# Patient Record
Sex: FEMALE | Race: BLACK OR AFRICAN AMERICAN | Employment: STUDENT | URBAN - METROPOLITAN AREA
[De-identification: names, ages, dates, MRNs, and addresses within clinical notes are randomized per-mention and may not be internally consistent; named-entity substitution may affect disease eponyms.]

---
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
## 2025-02-03 ENCOUNTER — OFFICE VISIT (OUTPATIENT)
Dept: PRIMARY CARE CLINIC | Facility: CLINIC | Age: 20
End: 2025-02-03

## 2025-02-03 VITALS
HEART RATE: 80 BPM | BODY MASS INDEX: 39.82 KG/M2 | TEMPERATURE: 97.9 F | WEIGHT: 239 LBS | DIASTOLIC BLOOD PRESSURE: 80 MMHG | OXYGEN SATURATION: 98 % | SYSTOLIC BLOOD PRESSURE: 118 MMHG | HEIGHT: 65 IN

## 2025-02-03 DIAGNOSIS — Z11.1 SCREENING EXAMINATION FOR PULMONARY TUBERCULOSIS: ICD-10-CM

## 2025-02-03 DIAGNOSIS — Z00.00 ENCOUNTER FOR WELL ADULT EXAM WITHOUT ABNORMAL FINDINGS: Primary | ICD-10-CM

## 2025-02-03 DIAGNOSIS — Z02.89 ENCOUNTER FOR COMPLETION OF FORM WITH PATIENT: ICD-10-CM

## 2025-02-03 SDOH — ECONOMIC STABILITY: FOOD INSECURITY: WITHIN THE PAST 12 MONTHS, THE FOOD YOU BOUGHT JUST DIDN'T LAST AND YOU DIDN'T HAVE MONEY TO GET MORE.: NEVER TRUE

## 2025-02-03 SDOH — ECONOMIC STABILITY: FOOD INSECURITY: WITHIN THE PAST 12 MONTHS, YOU WORRIED THAT YOUR FOOD WOULD RUN OUT BEFORE YOU GOT MONEY TO BUY MORE.: NEVER TRUE

## 2025-02-03 ASSESSMENT — PATIENT HEALTH QUESTIONNAIRE - PHQ9
SUM OF ALL RESPONSES TO PHQ QUESTIONS 1-9: 0
2. FEELING DOWN, DEPRESSED OR HOPELESS: NOT AT ALL
SUM OF ALL RESPONSES TO PHQ9 QUESTIONS 1 & 2: 0
SUM OF ALL RESPONSES TO PHQ QUESTIONS 1-9: 0
1. LITTLE INTEREST OR PLEASURE IN DOING THINGS: NOT AT ALL

## 2025-02-03 NOTE — PROGRESS NOTES
Well Adult Note  Name: Aaron Lee Today’s Date: 2/3/2025   MRN: [id number] Sex: Female   Age: 19 y.o. Ethnicity: Non- / Non    : 2005 Race: Black /       Aaron Lee is here for a well adult exam.       Assessment & Plan   Encounter for well adult exam without abnormal findings  Screening examination for pulmonary tuberculosis  Encounter for completion of form with patient      Return in 2 days (on 2025) for after 10:30 am tb reading nurse visit .       Subjective   History:  Patient here today for employment physical to work in a  .  Decline flu vaccine today and will complete blood work when have a chance .    PPD Placement note  Aaron Lee, 19 y.o. female is here today for placement of PPD test  Reason for PPD test: Work  Pt taken PPD test before: yes  Verified in allergy area and with patient that they are not allergic to the products PPD is made of (Phenol or Tween). No:   Is patient taking any oral or IV steroid medication now or have they taken it in the last month? no  Has the patient ever received the BCG vaccine?: no  Has the patient been in recent contact with anyone known or suspected of having active TB disease?: no       Date of exposure (if applicable):      Name of person they were exposed to (if applicable):   Patient's Country of origin?: [place]  O: Alert and oriented in NAD.  P:  PPD placed on 2/3/2025.  Patient advised to return for reading within 48-72 hours.     Review of Systems    No Known Allergies  Prior to Visit Medications    Medication Sig Taking? Authorizing Provider   levonorgestrel (MIRENA, 52 MG,) IUD 52 mg 1 each by IntraUTERine route once Yes ProviderSergoi MD   cetirizine (ZYRTEC) 10 MG tablet Take 1 tablet by mouth daily Yes ProviderSergio MD   ferrous sulfate (IRON 325) 325 (65 Fe) MG tablet Take 1 tablet by mouth daily (with breakfast) Yes ProviderSergio MD   albuterol (ACCUNEB) 0.63 MG/3ML

## 2025-02-05 ENCOUNTER — NURSE ONLY (OUTPATIENT)
Dept: PRIMARY CARE CLINIC | Facility: CLINIC | Age: 20
End: 2025-02-05

## 2025-02-05 LAB
INDURATION: 0
TB SKIN TEST: NEGATIVE

## 2025-02-05 NOTE — PROGRESS NOTES
PPD Reading Note  PPD read and results entered in [employer].  Result: 0 mm induration.  Interpretation: Negative   If test not read within 48-72 hours of initial placement, patient advised to repeat in other arm 1-3 weeks after this test.  Allergic reaction: no

## 2025-02-25 ENCOUNTER — APPOINTMENT (OUTPATIENT)
Dept: GENERAL RADIOLOGY | Age: 20
End: 2025-02-25
Payer: MEDICAID

## 2025-02-25 ENCOUNTER — HOSPITAL ENCOUNTER (EMERGENCY)
Age: 20
Discharge: HOME | End: 2025-02-25
Attending: GENERAL PRACTICE
Payer: MEDICAID

## 2025-02-25 VITALS
DIASTOLIC BLOOD PRESSURE: 70 MMHG | RESPIRATION RATE: 16 BRPM | BODY MASS INDEX: 39.99 KG/M2 | HEIGHT: 65 IN | HEART RATE: 84 BPM | OXYGEN SATURATION: 96 % | SYSTOLIC BLOOD PRESSURE: 135 MMHG | TEMPERATURE: 98.2 F | WEIGHT: 240 LBS

## 2025-02-25 DIAGNOSIS — J18.9 PNEUMONIA DUE TO INFECTIOUS ORGANISM, UNSPECIFIED LATERALITY, UNSPECIFIED PART OF LUNG: ICD-10-CM

## 2025-02-25 DIAGNOSIS — J45.901 MILD ASTHMA WITH EXACERBATION, UNSPECIFIED WHETHER PERSISTENT: Primary | ICD-10-CM

## 2025-02-25 PROCEDURE — 99283 EMERGENCY DEPT VISIT LOW MDM: CPT

## 2025-02-25 PROCEDURE — 71045 X-RAY EXAM CHEST 1 VIEW: CPT

## 2025-02-25 RX ORDER — PREDNISONE 20 MG/1
20 TABLET ORAL 2 TIMES DAILY
Qty: 10 TABLET | Refills: 0 | Status: SHIPPED | OUTPATIENT
Start: 2025-02-25 | End: 2025-03-02

## 2025-02-25 RX ORDER — AZITHROMYCIN 250 MG/1
TABLET, FILM COATED ORAL
Qty: 6 TABLET | Refills: 0 | Status: SHIPPED | OUTPATIENT
Start: 2025-02-25 | End: 2025-03-07

## 2025-02-25 RX ORDER — FLUCONAZOLE 150 MG/1
150 TABLET ORAL
Qty: 2 TABLET | Refills: 0 | Status: SHIPPED | OUTPATIENT
Start: 2025-02-25 | End: 2025-03-03

## 2025-02-25 ASSESSMENT — LIFESTYLE VARIABLES
HOW OFTEN DO YOU HAVE A DRINK CONTAINING ALCOHOL: NEVER
HOW MANY STANDARD DRINKS CONTAINING ALCOHOL DO YOU HAVE ON A TYPICAL DAY: PATIENT DOES NOT DRINK

## 2025-02-25 ASSESSMENT — PAIN - FUNCTIONAL ASSESSMENT
PAIN_FUNCTIONAL_ASSESSMENT: NONE - DENIES PAIN
PAIN_FUNCTIONAL_ASSESSMENT: 0-10

## 2025-02-25 ASSESSMENT — PAIN SCALES - GENERAL: PAINLEVEL_OUTOF10: 0

## 2025-02-25 NOTE — ED TRIAGE NOTES
Pt presents to ED via [facility] from home with c/o shortness of breath. Hx asthma, pt has had flu like symptoms for two days. No relief with albuterol at home. +N/V. Exposure to sick child.     Given 5mg albuterol and 125 solu medrol via [facility].

## 2025-02-25 NOTE — ED PROVIDER NOTES
Emergency Department Provider Note       PCP: Reyes, Rayma Y, APRN - RONNI   Age: 19 y.o.   Sex: female     DISPOSITION Decision To Discharge 02/25/2025 03:13:04 PM   DISPOSITION CONDITION Stable            ICD-10-CM    1. Mild asthma with exacerbation, unspecified whether persistent  J45.901       2. Pneumonia due to infectious organism, unspecified laterality, unspecified part of lung  J18.9           Medical Decision Making     Patient presents with asthma exacerbation.  Chest x-ray does show possible infiltrate.  She is well-appearing and feels much improved after treatments here.  Will go ahead and give her a course of steroids.  Due to the possible infiltrate pneumonia I will go ahead and prescribe a Z-Alphonso.  Patient oxygenating well on room air and heart rate has improved.  Return precautions are given.     1 chronic illness with exacerbation.  1 acute illness with systemic symptoms.  Prescription drug management performed.  Drug therapy given requiring intensive monitoring for toxicity.  Patient was discharged risks and benefits of hospitalization were considered.  Chronic medical problems impacting care include asthma.  Shared medical decision making was utilized in creating the patients health plan today.    I independently ordered and reviewed each unique test.       I interpreted the X-rays chest x-ray shows possible left lower lobe infiltrate.  No edema and normal heart size.  I have reviewed and agree with radiology.              History     Patient presents with cough and shortness of breath.  Patient does have history of asthma.  Possibly some flulike symptoms body aches chills no obvious fevers.  She denies chest pain.  Patient received Solu-Medrol and nebulizer en route and feels little improved        ROS     Review of Systems   All other systems reviewed and are negative.       Physical Exam     Vitals signs and nursing note reviewed:  Vitals:    02/25/25 1308 02/25/25 1415 02/25/25 1520   BP:

## 2025-07-31 ENCOUNTER — TELEMEDICINE (OUTPATIENT)
Dept: PRIMARY CARE CLINIC | Facility: CLINIC | Age: 20
End: 2025-07-31
Payer: MEDICAID

## 2025-07-31 DIAGNOSIS — J45.20 MILD INTERMITTENT ASTHMA WITHOUT COMPLICATION: ICD-10-CM

## 2025-07-31 DIAGNOSIS — J34.89 SINUS DRAINAGE: ICD-10-CM

## 2025-07-31 DIAGNOSIS — D50.9 IRON DEFICIENCY ANEMIA, UNSPECIFIED IRON DEFICIENCY ANEMIA TYPE: ICD-10-CM

## 2025-07-31 DIAGNOSIS — R05.1 ACUTE COUGH: ICD-10-CM

## 2025-07-31 DIAGNOSIS — J01.10 ACUTE NON-RECURRENT FRONTAL SINUSITIS: Primary | ICD-10-CM

## 2025-07-31 DIAGNOSIS — L30.9 CHRONIC ECZEMA: ICD-10-CM

## 2025-07-31 DIAGNOSIS — J30.2 SEASONAL ALLERGIES: ICD-10-CM

## 2025-07-31 PROCEDURE — 99214 OFFICE O/P EST MOD 30 MIN: CPT

## 2025-07-31 RX ORDER — FERROUS SULFATE 325(65) MG
325 TABLET ORAL
Qty: 90 TABLET | Refills: 0 | Status: SHIPPED | OUTPATIENT
Start: 2025-07-31

## 2025-07-31 RX ORDER — TRIAMCINOLONE ACETONIDE 0.25 MG/G
CREAM TOPICAL EVERY 4 HOURS PRN
Qty: 15 G | Refills: 0 | Status: SHIPPED | OUTPATIENT
Start: 2025-07-31

## 2025-07-31 RX ORDER — DOXYCYCLINE HYCLATE 100 MG
100 TABLET ORAL 2 TIMES DAILY
Qty: 14 TABLET | Refills: 0 | Status: SHIPPED | OUTPATIENT
Start: 2025-07-31 | End: 2025-08-07

## 2025-07-31 RX ORDER — ALBUTEROL SULFATE 0.63 MG/3ML
1 SOLUTION RESPIRATORY (INHALATION) EVERY 6 HOURS PRN
Qty: 270 ML | Refills: 0 | Status: SHIPPED | OUTPATIENT
Start: 2025-07-31

## 2025-07-31 RX ORDER — CETIRIZINE HYDROCHLORIDE 10 MG/1
10 TABLET ORAL DAILY
Qty: 90 TABLET | Refills: 0 | Status: SHIPPED | OUTPATIENT
Start: 2025-07-31

## 2025-07-31 RX ORDER — TRIAMCINOLONE ACETONIDE 0.25 MG/G
CREAM TOPICAL EVERY 4 HOURS PRN
COMMUNITY
End: 2025-07-31 | Stop reason: SDUPTHER

## 2025-07-31 RX ORDER — BROMPHENIRAMINE MALEATE, PSEUDOEPHEDRINE HYDROCHLORIDE, AND DEXTROMETHORPHAN HYDROBROMIDE 2; 30; 10 MG/5ML; MG/5ML; MG/5ML
5 SYRUP ORAL 3 TIMES DAILY PRN
Qty: 75 ML | Refills: 0 | Status: SHIPPED | OUTPATIENT
Start: 2025-07-31 | End: 2025-08-05

## 2025-07-31 ASSESSMENT — ENCOUNTER SYMPTOMS
COUGH: 1
SINUS COMPLAINT: 1
SORE THROAT: 1

## 2025-07-31 NOTE — ASSESSMENT & PLAN NOTE
Refill     Orders:    triamcinolone (KENALOG) 0.025 % cream; Apply topically every 4 hours as needed (eczema) Apply Topically

## 2025-07-31 NOTE — PROGRESS NOTES
anemia type   Discuss to schedule physical for labs follow up    Orders:    ferrous sulfate (IRON 325) 325 (65 Fe) MG tablet; Take 1 tablet by mouth daily (with breakfast)    Chronic eczema   Refill     Orders:    triamcinolone (KENALOG) 0.025 % cream; Apply topically every 4 hours as needed (eczema) Apply Topically    Acute cough   From PND no wheezing or SOB.    Orders:    brompheniramine-pseudoephedrine-DM 2-30-10 MG/5ML syrup; Take 5 mLs by mouth 3 times daily as needed for Cough      Return in about 3 months (around 10/31/2025) for Physical .       Subjective   Sinus Problem  This is a new problem. The current episode started yesterday. The problem has been gradually worsening since onset. There has been no fever. Fever duration: no fever. She is experiencing no pain. Associated symptoms include congestion, coughing (from post nasal drip) and a sore throat. Pertinent negatives include no chills, ear pain or headaches. Past treatments include nothing. The treatment provided no relief.     Review of Systems   Constitutional:  Negative for chills.   HENT:  Positive for congestion and sore throat. Negative for ear pain.    Respiratory:  Positive for cough (from post nasal drip).    Neurological:  Negative for headaches.          Objective   Patient-Reported Vitals  Patient-Reported Weight: 244lbs       Physical Exam             --Rayma Y Reyes, APRN - CNP

## 2025-09-02 ENCOUNTER — TELEPHONE (OUTPATIENT)
Dept: PRIMARY CARE CLINIC | Facility: CLINIC | Age: 20
End: 2025-09-02